# Patient Record
(demographics unavailable — no encounter records)

---

## 2025-05-16 NOTE — PHYSICAL EXAM
[de-identified] : RIGHT HIP  INSPECTION Appearance: [no erythema, gross deformities or malalignment] Gait Pattern: [normal] Assistive Devices: [none]  PALPATION Pubic symphisis: [none] Trochanteric Bursa: [none] Gluteus Medius: [none] Gluteus Minimus: [none] Tensor Fascia Debbie: [none] ITB: [none] Iliopsoas: [none] Sartorious: [none] Abductors: [none] Adductors: [none] Piriformis: [none] SI Joint: [none] Ischial Tuberosity: [none]  HIP PROM Flexion: [full 0-120 degrees] Abduction: [full 0-50 degrees] External Rotation: [0-40 degrees] Internal Rotation: [0-40 degrees]  SENSATION/VASCULAR Light touch: [Intact over the superficial and deep peroneal nerve distributions and the posterior tibial nerve distribution] Capillary refill: [less than two seconds] Pulses: [PT and DP pulses 2+ equal bilaterally] Calf: [No calf swelling or tenderness bilaterally] Compartments: [soft and compressible]  MOTOR STRENGTH Knee Flexion (hamstrings): [5/5] Knee Extension (quadriceps): [5/5] Hip Flexion: [5/5] Hip Extension: [5/5] Hip Abduction: [5/5] Hip Adduction: [5/5] Ankle Dorsiflexion: [5/5] Ankle Plantarflexion: [5/5]  SPECIAL TESTS Log roll: [negative] EMMY: [negative] FADIR: [negative] Loaded Flexion/Scour: [painless] Stinchfield: [negative] Fulcrum: [negative] Compression Test-Foot: [negative] Terence's: [negative] Piriformis Compression: [negative]  GROIN EXAM Terrell adductor squeeze test in extension: [negative] SLR: [negative]  LEFT HIP Inspection: [no erythema, gross deformities or malalignment] Tenderness: [default]  Nontender: [trochanteric bursa, gluteus medius, gluteus minimus, TFL, ITB, iliopsoas, sartorius, abductors, adductors, SI joint, piriformis, ischial tuberosity]  Range of motion: [full painless range of motion in all planes]  Sensation: Strength: [5/5 and painless in all planes]  Special tests: [negative log roll, EMMY, FADIR, Stinchfield, painless loaded flexion/scour, Fulcrum, Terence's]

## 2025-05-16 NOTE — HISTORY OF PRESENT ILLNESS
[de-identified] : CC: This is a very pleasant 34-year-old male that presents to the office today as a new patient with right hip pain.  Referring Provider: [ ]  PMHx: [default] Medications: [ ] Allergies: [ ] Social History: [ ] Surgical History: [ ]  Family History: [ ] Occupation: [ ]